# Patient Record
Sex: FEMALE | Race: ASIAN | URBAN - METROPOLITAN AREA
[De-identification: names, ages, dates, MRNs, and addresses within clinical notes are randomized per-mention and may not be internally consistent; named-entity substitution may affect disease eponyms.]

---

## 2019-12-07 ENCOUNTER — WALK IN (OUTPATIENT)
Dept: URGENT CARE | Age: 29
End: 2019-12-07

## 2019-12-07 VITALS
TEMPERATURE: 99 F | RESPIRATION RATE: 16 BRPM | SYSTOLIC BLOOD PRESSURE: 130 MMHG | HEART RATE: 84 BPM | DIASTOLIC BLOOD PRESSURE: 84 MMHG

## 2019-12-07 DIAGNOSIS — L73.9 FOLLICULITIS: ICD-10-CM

## 2019-12-07 DIAGNOSIS — B96.89 LOCALIZED BACTERIAL SKIN INFECTION: Primary | ICD-10-CM

## 2019-12-07 DIAGNOSIS — L08.9 LOCALIZED BACTERIAL SKIN INFECTION: Primary | ICD-10-CM

## 2019-12-07 PROCEDURE — X0943 AMG SELF PAY VISIT: HCPCS | Performed by: NURSE PRACTITIONER

## 2019-12-07 RX ORDER — CEPHALEXIN 500 MG/1
500 CAPSULE ORAL 2 TIMES DAILY
Qty: 20 CAPSULE | Refills: 0 | Status: SHIPPED | OUTPATIENT
Start: 2019-12-07 | End: 2019-12-17

## 2019-12-07 ASSESSMENT — ENCOUNTER SYMPTOMS
DIZZINESS: 0
HEADACHES: 0
COLOR CHANGE: 1
FEVER: 0
CHILLS: 0

## 2020-01-25 ENCOUNTER — APPOINTMENT (OUTPATIENT)
Dept: URGENT CARE | Age: 30
End: 2020-01-25

## 2021-02-28 ENCOUNTER — APPOINTMENT (OUTPATIENT)
Dept: URGENT CARE | Age: 31
End: 2021-02-28

## 2021-06-29 ENCOUNTER — OFFICE VISIT (OUTPATIENT)
Dept: INTERNAL MEDICINE CLINIC | Facility: CLINIC | Age: 31
End: 2021-06-29
Payer: MEDICAID

## 2021-06-29 VITALS
WEIGHT: 192 LBS | DIASTOLIC BLOOD PRESSURE: 64 MMHG | OXYGEN SATURATION: 98 % | RESPIRATION RATE: 16 BRPM | HEART RATE: 67 BPM | BODY MASS INDEX: 31.99 KG/M2 | HEIGHT: 65 IN | TEMPERATURE: 97 F | SYSTOLIC BLOOD PRESSURE: 118 MMHG

## 2021-06-29 DIAGNOSIS — K64.4 RESIDUAL HEMORRHOIDAL SKIN TAGS: Primary | ICD-10-CM

## 2021-06-29 PROCEDURE — 99213 OFFICE O/P EST LOW 20 MIN: CPT | Performed by: INTERNAL MEDICINE

## 2021-06-29 PROCEDURE — 3074F SYST BP LT 130 MM HG: CPT | Performed by: INTERNAL MEDICINE

## 2021-06-29 PROCEDURE — 3008F BODY MASS INDEX DOCD: CPT | Performed by: INTERNAL MEDICINE

## 2021-06-29 PROCEDURE — 3078F DIAST BP <80 MM HG: CPT | Performed by: INTERNAL MEDICINE

## 2021-06-29 NOTE — PROGRESS NOTES
Patient Office Visit    ASSESSMENT AND PLAN:   (K64.4) Residual hemorrhoidal skin tags  (primary encounter diagnosis)  Plan: OP REFERRAL TO GENERAL SURGERY  Note: continue a high fiber diet.  Given no improvement with conservative treatment, will refer to s file.  Allergies:  No Known Allergies  Current Meds:  No current outpatient medications on file prior to visit. No current facility-administered medications on file prior to visit.         REVIEW OF SYSTEMS   Constitutional: no fatigue normal energy no kane

## 2021-07-22 ENCOUNTER — OFFICE VISIT (OUTPATIENT)
Dept: SURGERY | Facility: CLINIC | Age: 31
End: 2021-07-22
Payer: MEDICAID

## 2021-07-22 VITALS
SYSTOLIC BLOOD PRESSURE: 112 MMHG | TEMPERATURE: 97 F | HEART RATE: 75 BPM | BODY MASS INDEX: 32.15 KG/M2 | HEIGHT: 65 IN | WEIGHT: 193 LBS | DIASTOLIC BLOOD PRESSURE: 80 MMHG

## 2021-07-22 DIAGNOSIS — K64.2 GRADE III INTERNAL HEMORRHOIDS: Primary | ICD-10-CM

## 2021-07-22 PROCEDURE — 3074F SYST BP LT 130 MM HG: CPT | Performed by: COLON & RECTAL SURGERY

## 2021-07-22 PROCEDURE — 3008F BODY MASS INDEX DOCD: CPT | Performed by: COLON & RECTAL SURGERY

## 2021-07-22 PROCEDURE — 46600 DIAGNOSTIC ANOSCOPY SPX: CPT | Performed by: COLON & RECTAL SURGERY

## 2021-07-22 PROCEDURE — 3079F DIAST BP 80-89 MM HG: CPT | Performed by: COLON & RECTAL SURGERY

## 2021-07-22 PROCEDURE — 99243 OFF/OP CNSLTJ NEW/EST LOW 30: CPT | Performed by: COLON & RECTAL SURGERY

## 2021-07-22 RX ORDER — MULTIVITAMIN WITH IRON
TABLET ORAL
COMMUNITY

## 2021-07-22 NOTE — H&P
New Patient Visit Note       Active Problems      1.  Grade III internal hemorrhoids        Chief Complaint   Patient presents with:  Hemorrhoids      History of Present Illness   Amada Melendez is a 26year old female referred by Dr. Foy Jeans  for evaluat patient does not have a cystocele. The patient does not have prolapse of the vagina or cervix. The patient's family history is negative for Colon or rectal cancer . The patient denies any chronic medical conditions.     The patient denies any previou for environmental allergies and food allergies. Neurological: Negative for dizziness, syncope, weakness and light-headedness. Hematological: Negative for adenopathy. Does not bruise/bleed easily.    Psychiatric/Behavioral: Negative for agitation, confus

## 2024-05-21 ENCOUNTER — TELEPHONE (OUTPATIENT)
Facility: CLINIC | Age: 34
End: 2024-05-21

## 2024-05-21 DIAGNOSIS — M25.571 ACUTE RIGHT ANKLE PAIN: Primary | ICD-10-CM

## 2024-05-21 NOTE — TELEPHONE ENCOUNTER
DOI - 5/20/2024    No protocol for fibula fracture~ Please advise if you would like another day of appt?    It was done at an urgent care in [place]. She has the xray images on disc, she will bring. She is in a temporary wrap for her foot/leg. Crutches.          Future Appointments   Date Time Provider Department Center   5/28/2024  7:30 AM [id number]

## 2024-05-21 NOTE — TELEPHONE ENCOUNTER
Patient scheduled with Loreto for a right fibula fx. Patient will be bringing xray disc to appt from [facility]. Please advise if new imaging needed.     Future Appointments   Date Time Provider Department Center   5/28/2024  7:30 AM [id number]

## 2024-05-22 NOTE — TELEPHONE ENCOUNTER
Spoke with patient who is going to email image and report to this writer since she does not have MyChart.      Received report from [facility].  \"Obliquely oriented fracture of the lateral malleolus without significant angulation.  Lateral subluxation of the talus indicative of a tear of the deltoid ligament.\"     Yes- if distal fib, obtain ankle if mid or proximal obtain tib/fib. Thanks!     Xray ordered, scheduled and patient is aware to come in early

## 2024-05-22 NOTE — TELEPHONE ENCOUNTER
Loreto Ashford PA   to [facility]         5/22/24  8:39 AM   Yes- if distal fib, obtain ankle if mid or proximal obtain tib/fib. Thanks!

## 2024-05-22 NOTE — TELEPHONE ENCOUNTER
Left VM to have patient call us back with location of fracture for imaging day of the appointment per Sincer.    Loreto Ashford PA   to [facility]           5/22/24  8:39 AM   Yes- if distal fib, obtain ankle if mid or proximal obtain tib/fib. Thanks!